# Patient Record
Sex: FEMALE | Race: WHITE
[De-identification: names, ages, dates, MRNs, and addresses within clinical notes are randomized per-mention and may not be internally consistent; named-entity substitution may affect disease eponyms.]

---

## 2022-01-25 ENCOUNTER — HOSPITAL ENCOUNTER (OUTPATIENT)
Dept: HOSPITAL 47 - RADCTMAIN | Age: 77
Discharge: HOME | End: 2022-01-25
Attending: SURGERY
Payer: MEDICARE

## 2022-01-25 DIAGNOSIS — C20: ICD-10-CM

## 2022-01-25 DIAGNOSIS — N85.2: ICD-10-CM

## 2022-01-25 DIAGNOSIS — Z01.818: Primary | ICD-10-CM

## 2022-01-25 LAB
ANION GAP SERPL CALC-SCNC: 9 MMOL/L
BUN SERPL-SCNC: 17 MG/DL (ref 7–17)
CALCIUM SPEC-MCNC: 9.8 MG/DL (ref 8.4–10.2)
CHLORIDE SERPL-SCNC: 102 MMOL/L (ref 98–107)
CO2 SERPL-SCNC: 26 MMOL/L (ref 22–30)
GLUCOSE SERPL-MCNC: 132 MG/DL (ref 74–99)
POTASSIUM SERPL-SCNC: 4.5 MMOL/L (ref 3.5–5.1)
SODIUM SERPL-SCNC: 137 MMOL/L (ref 137–145)

## 2022-01-25 PROCEDURE — 36415 COLL VENOUS BLD VENIPUNCTURE: CPT

## 2022-01-25 PROCEDURE — 74177 CT ABD & PELVIS W/CONTRAST: CPT

## 2022-01-25 PROCEDURE — 80048 BASIC METABOLIC PNL TOTAL CA: CPT

## 2022-01-25 PROCEDURE — 71260 CT THORAX DX C+: CPT

## 2022-01-25 NOTE — CT
EXAMINATION TYPE: CT ChestAbdPelvis w con

 

DATE OF EXAM: 1/25/2022

 

COMPARISON: None

 

HISTORY: Rectal cancer.

 

CT DLP: 612.5 mGycm

 

CONTRAST: 

CT scan of the chest, abdomen and pelvis is performed with Oral Contrast and with IV Contrast, patien
t injected with 100ml mL of Isovue 300.

 

CT  Chest:

LUNGS: The lungs are clear and free of infiltrate or atelectasis.  No pulmonary nodule or mass is det
ected.  No pleural effusion or CT evidence of interstitial lung disease.

 

MEDIASTINUM:  Thoracic aorta is of normal caliber.  The heart is not enlarged.  No evidence for media
stinal mass or adenopathy.

 

HILAR STRUCTURES: No evidence for mass.  No hilar adenopathy is appreciated.

 

OTHER: Severe thoracic kyphosis.

 

CONTRAST CT ABDOMEN AND PELVIS FINDINGS: 

 

LIVER/GB:   No calcified gallstones.  No space occupying hepatic lesion. Biliary tree is of normal ca
liber. 

 

PANCREAS:  No inflammation.  No distinct mass. 

 

SPLEEN:  No splenic enlargement.  No lesion seen. 

 

ADRENALS:  No nodule.  No thickening. 

 

KIDNEYS/BLADDER:  No hydronephrosis.  No nephrolithiasis.  No disctinct renal mass. Torers catheter is
 in place.

 

BOWEL: PEG tube is noted in place. Normal bowel caliber.  No inflammation. Rectal wall thickening may
 reflect underlying rectal carcinoma. Correlate with direct visualization.

 

GENITAL ORGANS: Large lobulated uterus with multiple masses noted some of which are calcified may ref
lect leiomyomatous change. Correlate with ultrasound.

 

LYMPH NODES:  No greater than 1cm abdominal or pelvic lymph nodes are appreciated.

 

AORTA: No significant abnormality. 

 

OSSEOUS STRUCTURES:  No significant abnormality is seen. 

 

OTHER:  No significant additional abnormality is seen.  

 

IMPRESSION: 

1. Rectal wall thickening may reflect underlying rectal carcinoma. Correlate with direct visualizatio
n.

2. No evidence for metastatic disease at this time.

3. Enlarged lobulated uterus. Correlate for underlying leiomyomatous change.

## 2022-06-15 ENCOUNTER — HOSPITAL ENCOUNTER (EMERGENCY)
Dept: HOSPITAL 47 - EC | Age: 77
Discharge: HOME | End: 2022-06-15
Payer: MEDICARE

## 2022-06-15 VITALS
RESPIRATION RATE: 18 BRPM | DIASTOLIC BLOOD PRESSURE: 81 MMHG | SYSTOLIC BLOOD PRESSURE: 130 MMHG | HEART RATE: 76 BPM | TEMPERATURE: 98.6 F

## 2022-06-15 DIAGNOSIS — Z46.6: ICD-10-CM

## 2022-06-15 DIAGNOSIS — T83.091A: Primary | ICD-10-CM

## 2022-06-15 PROCEDURE — 51702 INSERT TEMP BLADDER CATH: CPT

## 2022-06-15 PROCEDURE — 99283 EMERGENCY DEPT VISIT LOW MDM: CPT

## 2022-06-15 NOTE — ED
General Adult HPI





- General


Stated complaint: covarrubias problem


Time Seen by Provider: 06/15/22 09:59


Source: patient, EMS, RN notes reviewed


Mode of arrival: EMS


Limitations: altered mental status, physical limitation





- History of Present Illness


Initial comments: 


This a 77-year-old female presents emergency Department the EMS for Covarrubias Issue.

 Patient needs a Covarrubias catheter exchange.  Patient is on hospice sent here 

hospice nurse for strictly only fully catheter exchange there is no other 

treatment needed or required.  Patient has no specific complaints.








- Related Data


                                    Allergies











Allergy/AdvReac Type Severity Reaction Status Date / Time


 


No Known Allergies Allergy   Verified 06/15/22 10:10














Review of Systems


ROS Statement: 


Those systems with pertinent positive or pertinent negative responses have been 

documented in the HPI.





ROS Other: All systems not noted in ROS Statement are negative.





General Exam


General appearance: alert, in no apparent distress


Respiratory exam: Present: normal lung sounds bilaterally.  Absent: respiratory 

distress, wheezes, rales, rhonchi, stridor


Cardiovascular Exam: Present: regular rate, normal rhythm, normal heart sounds. 

Absent: systolic murmur, diastolic murmur, rubs, gallop, clicks


GI/Abdominal exam: Present: soft, normal bowel sounds.  Absent: distended, 

tenderness, guarding, rebound, rigid





Course


                                   Vital Signs











  06/15/22





  10:09


 


Temperature 98.6 F


 


Pulse Rate 76


 


Respiratory 18





Rate 


 


Blood Pressure 130/81


 


O2 Sat by Pulse 98





Oximetry 














Medical Decision Making





- Medical Decision Making





Covarrubias catheter was exchanged.  Patient discharged back home.





Disposition


Clinical Impression: 


 Covarrubias catheter problem, Encounter for Covarrubias catheter replacement





Disposition: HOME SELF-CARE


Condition: Stable


Instructions (If sedation given, give patient instructions):  Covarrubias Catheter 

Placement and Care (ED)


Additional Instructions: 


Please return to the Emergency Department if symptoms worsen or any other 

concerns.


Is patient prescribed a controlled substance at d/c from ED?: No


Referrals: 


Dutch Hassan MD [Primary Care Provider] - 1-2 days


Time of Disposition: 10:01

## 2022-07-05 ENCOUNTER — HOSPITAL ENCOUNTER (EMERGENCY)
Dept: HOSPITAL 47 - EC | Age: 77
Discharge: HOME | End: 2022-07-05
Payer: MEDICARE

## 2022-07-05 VITALS — HEART RATE: 100 BPM | SYSTOLIC BLOOD PRESSURE: 140 MMHG | DIASTOLIC BLOOD PRESSURE: 107 MMHG

## 2022-07-05 VITALS — RESPIRATION RATE: 18 BRPM

## 2022-07-05 DIAGNOSIS — R11.2: ICD-10-CM

## 2022-07-05 DIAGNOSIS — T83.098A: Primary | ICD-10-CM

## 2022-07-05 PROCEDURE — 99283 EMERGENCY DEPT VISIT LOW MDM: CPT

## 2022-07-05 PROCEDURE — 51702 INSERT TEMP BLADDER CATH: CPT

## 2022-07-05 NOTE — ED
Recheck HPI





- General


Stated Complaint: Covarrubias Issue


Time Seen by Provider: 07/05/22 22:17


Source: EMS, RN notes reviewed, old records reviewed


Mode of arrival: EMS


Limitations: no limitations





- History of Present Illness


Initial Comments: 





This is a 77-year-old female to the emergency department for evaluation.  

Patient presents today for evaluation of malfunctioning Covarrubias catheter.  Patient

has some mild pain.  Comes in for evaluation in regards to for catheter 

replacement also having pain.  Patient is mildly nauseous with vomiting


MD Complaint: other (covarrubias catheter replacement)


-: hour(s)


Returns Today for: persistent/worsening pain related to initial visit


Symptoms Since Prior Visit: worsening pain


Context: other (pain, malfunction)


Associated Symptoms: none


Treatments Prior to Arrival: other (none)





- Related Data


                                    Allergies











Allergy/AdvReac Type Severity Reaction Status Date / Time


 


No Known Allergies Allergy   Verified 06/15/22 10:10














Review of Systems


ROS Statement: 


Those systems with pertinent positive or pertinent negative responses have been 

documented in the HPI.





ROS Other: All systems not noted in ROS Statement are negative.





Past Medical History


Past Medical History: Unable to Obtain


Past Surgical History: Unable to Obtain


Smoking Status: Never smoker


Past Alcohol Use History: None Reported


Past Drug Use History: None Reported





General Exam


Limitations: no limitations


General appearance: alert, in no apparent distress


Head exam: Present: atraumatic, normocephalic, normal inspection


Eye exam: Present: normal appearance, PERRL, EOMI.  Absent: scleral icterus, 

conjunctival injection, periorbital swelling


ENT exam: Present: normal exam, mucous membranes moist


Neck exam: Present: normal inspection.  Absent: tenderness, meningismus, 

lymphadenopathy


Respiratory exam: Present: normal lung sounds bilaterally.  Absent: respiratory 

distress, wheezes, rales, rhonchi, stridor


Cardiovascular Exam: Present: regular rate, normal rhythm, normal heart sounds. 

Absent: systolic murmur, diastolic murmur, rubs, gallop, clicks


GI/Abdominal exam: Present: soft, normal bowel sounds.  Absent: distended, 

tenderness, guarding, rebound, rigid


Extremities exam: Present: normal inspection, full ROM, normal capillary refill.

 Absent: tenderness, pedal edema, joint swelling, calf tenderness


Back exam: Present: normal inspection


Neurological exam: Present: alert, oriented X3, CN II-XII intact


Psychiatric exam: Present: normal affect, normal mood


Skin exam: Present: warm, dry, intact, normal color.  Absent: rash





Course





                                   Vital Signs











  07/05/22





  22:20


 


Pulse Rate 93


 


Respiratory 18





Rate 


 


Blood Pressure 156/119


 


O2 Sat by Pulse 97





Oximetry 














- Reevaluation(s)


Reevaluation #1: 





07/05/22 22:32


Medical records reviewed


Reevaluation #2: 





07/05/22 22:32


Symptoms improved here in the ER


Reevaluation #3: 





07/05/22 22:32


Patient's Covarrubias catheter has been replaced





Procedures





- Catheter Insertion (Urinary)


Indications: replaced: fell out/removed/no longer functioning


Prophylactic Antibiotics Given: No


Bladder Scan/US before Catheterization: No


Preparation: Povidone-Iodine


Type of Catheter Inserted: Covarrubias


Catheter Balloon Size (mLs): 10


Topical Anesthesia Used: Yes


Results: successfully catheterized-immediate flow


Patient Tolerated Procedure: well


Complications: none





Medical Decision Making





- Medical Decision Making





77 female able return home after replacement of Covarrubias catheter.  Patient can be 

discharged





Disposition


Clinical Impression: 


 Covarrubias catheter problem, Encounter for Covarrubias catheter replacement, Nausea & 

vomiting





Disposition: HOME SELF-CARE


Condition: Good


Instructions (If sedation given, give patient instructions):  Covarrubias Catheter 

Placement and Care (ED)


Is patient prescribed a controlled substance at d/c from ED?: No


Referrals: 


Dutch Hassan MD [Primary Care Provider] - 1-2 days

## 2022-07-14 ENCOUNTER — HOSPITAL ENCOUNTER (EMERGENCY)
Dept: HOSPITAL 47 - EC | Age: 77
Discharge: HOME | End: 2022-07-14
Payer: MEDICARE

## 2022-07-14 VITALS
TEMPERATURE: 97 F | RESPIRATION RATE: 16 BRPM | HEART RATE: 75 BPM | DIASTOLIC BLOOD PRESSURE: 77 MMHG | SYSTOLIC BLOOD PRESSURE: 124 MMHG

## 2022-07-14 DIAGNOSIS — Z46.6: Primary | ICD-10-CM

## 2022-07-14 PROCEDURE — 99283 EMERGENCY DEPT VISIT LOW MDM: CPT

## 2022-07-14 NOTE — ED
General Adult HPI





- General


Stated complaint: catheter issues


Time Seen by Provider: 07/14/22 12:11


Source: patient, RN notes reviewed


Mode of arrival: EMS


Limitations: no limitations





- History of Present Illness


Initial comments: 





Patient is a pleasant 77-year-old female with stage IV colon cancer on hospice 

presenting to the emergency department with full catheter issues.  Patient has 

had catheter placed for urinary retention and hospice care.  Patient has been 

urinating around the Torres catheter.  Minimal output through the catheter over 

the past 2 days.  Patient denies any pain.  Patient does have history of similar

problems previously.





- Related Data


                                    Allergies











Allergy/AdvReac Type Severity Reaction Status Date / Time


 


No Known Allergies Allergy   Verified 06/15/22 10:10














Review of Systems


ROS Statement: 


Those systems with pertinent positive or pertinent negative responses have been 

documented in the HPI.





ROS Other: All systems not noted in ROS Statement are negative.


Constitutional: Denies: fever


Eyes: Denies: eye pain


ENT: Denies: ear pain


Respiratory: Denies: cough


Cardiovascular: Denies: chest pain


Endocrine: Reports: fatigue


Gastrointestinal: Denies: abdominal pain


Genitourinary: Reports: as per HPI





Past Medical History


Past Medical History: Unable to Obtain


Past Surgical History: Unable to Obtain


Smoking Status: Never smoker


Past Alcohol Use History: None Reported


Past Drug Use History: None Reported





General Exam


Limitations: no limitations


General appearance: alert, in no apparent distress


Head exam: Present: normocephalic


Eye exam: Present: normal appearance


Neck exam: Present: normal inspection


Respiratory exam: Present: normal lung sounds bilaterally


Cardiovascular Exam: Present: regular rate, normal rhythm


GI/Abdominal exam: Present: soft.  Absent: tenderness


Extremities exam: Present: normal inspection


Neurological exam: Present: alert, other (Hemiparesis on the left side)


Psychiatric exam: Present: normal affect, normal mood


Skin exam: Present: normal color





Course


                                   Vital Signs











  07/14/22





  12:15


 


Temperature 97.0 F L


 


Pulse Rate 75


 


Respiratory 16





Rate 


 


Blood Pressure 124/77


 


O2 Sat by Pulse 97





Oximetry 














Medical Decision Making





- Medical Decision Making





Larger catheter placed with good urine output





Disposition


Clinical Impression: 


 Encounter for Torres catheter replacement





Disposition: HOME SELF-CARE


Condition: Stable


Instructions (If sedation given, give patient instructions):  Chronic Urinary 

Retention in Women (ED)


Additional Instructions: 


Please do follow-up with your primary care physician in the next couple of days 

for recheck.  Return for further catheter problems, worsening symptoms or other 

concerns.


Is patient prescribed a controlled substance at d/c from ED?: No


Referrals: 


Mik Soni MD [Primary Care Provider] - 1-2 days


Time of Disposition: 15:58